# Patient Record
Sex: MALE | Race: WHITE
[De-identification: names, ages, dates, MRNs, and addresses within clinical notes are randomized per-mention and may not be internally consistent; named-entity substitution may affect disease eponyms.]

---

## 2022-05-27 ENCOUNTER — HOSPITAL ENCOUNTER (OUTPATIENT)
Dept: HOSPITAL 63 - RAD | Age: 42
End: 2022-05-27
Payer: OTHER GOVERNMENT

## 2022-05-27 DIAGNOSIS — M79.671: ICD-10-CM

## 2022-05-27 DIAGNOSIS — M17.12: Primary | ICD-10-CM

## 2022-05-27 NOTE — RAD
EXAM: Bilateral feet, 3 views.



HISTORY: Pain.



COMPARISON: None.



FINDINGS: 3 views of both feet are obtained. There is no acute fracture, dislocation or subluxation. 
There is minimal degenerative spurring involving the left first metatarsal phalangeal joint.



IMPRESSION: 1. No acute osseous finding.

2. 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



Electronically signed by: Michelle Quigley MD (5/27/2022 5:13 PM) [id number]